# Patient Record
Sex: FEMALE | Race: WHITE | NOT HISPANIC OR LATINO | URBAN - METROPOLITAN AREA
[De-identification: names, ages, dates, MRNs, and addresses within clinical notes are randomized per-mention and may not be internally consistent; named-entity substitution may affect disease eponyms.]

---

## 2023-08-29 ENCOUNTER — APPOINTMENT (OUTPATIENT)
Dept: PRIMARY CARE | Facility: CLINIC | Age: 28
End: 2023-08-29
Payer: COMMERCIAL

## 2023-10-23 ENCOUNTER — TELEPHONE (OUTPATIENT)
Dept: OBSTETRICS AND GYNECOLOGY | Facility: CLINIC | Age: 28
End: 2023-10-23
Payer: COMMERCIAL

## 2023-10-23 DIAGNOSIS — N83.209 CYST OF OVARY, UNSPECIFIED LATERALITY: Primary | ICD-10-CM

## 2023-10-23 NOTE — TELEPHONE ENCOUNTER
Patient states that she was to have an ultrasound but order was not entered. She is request the order to schedule.

## 2023-10-24 NOTE — TELEPHONE ENCOUNTER
Called pt and left message on voicemail of request completed and she can call and scheduled appointment and to call back if needed

## 2024-07-31 ENCOUNTER — TELEPHONE (OUTPATIENT)
Dept: OBSTETRICS AND GYNECOLOGY | Facility: CLINIC | Age: 29
End: 2024-07-31
Payer: COMMERCIAL

## 2024-07-31 DIAGNOSIS — R39.9 UTI SYMPTOMS: ICD-10-CM

## 2024-07-31 NOTE — TELEPHONE ENCOUNTER
Patient called due to increase pelvic discomforted for 3 days. Patient stated she has UTI symptoms. Frequency or urination and burning. Patient is not taking anything over the counter but stated she felt like she was getting better with drinking water and cranberry juice. Patient has an appointment this coming Friday. Orders placed for UA and urine culture.

## 2024-08-02 ENCOUNTER — APPOINTMENT (OUTPATIENT)
Dept: OBSTETRICS AND GYNECOLOGY | Facility: CLINIC | Age: 29
End: 2024-08-02
Payer: COMMERCIAL

## 2024-08-02 VITALS
DIASTOLIC BLOOD PRESSURE: 62 MMHG | BODY MASS INDEX: 22.96 KG/M2 | WEIGHT: 155 LBS | HEIGHT: 69 IN | SYSTOLIC BLOOD PRESSURE: 98 MMHG

## 2024-08-02 DIAGNOSIS — N91.4 SECONDARY OLIGOMENORRHEA: ICD-10-CM

## 2024-08-02 DIAGNOSIS — R10.30 LOWER ABDOMINAL PAIN: ICD-10-CM

## 2024-08-02 DIAGNOSIS — Z01.419 ENCOUNTER FOR ANNUAL ROUTINE GYNECOLOGICAL EXAMINATION: Primary | ICD-10-CM

## 2024-08-02 PROCEDURE — 99395 PREV VISIT EST AGE 18-39: CPT | Performed by: OBSTETRICS & GYNECOLOGY

## 2024-08-02 PROCEDURE — 1036F TOBACCO NON-USER: CPT | Performed by: OBSTETRICS & GYNECOLOGY

## 2024-08-02 PROCEDURE — 3008F BODY MASS INDEX DOCD: CPT | Performed by: OBSTETRICS & GYNECOLOGY

## 2024-08-02 ASSESSMENT — PAIN SCALES - GENERAL: PAINLEVEL: 0-NO PAIN

## 2024-08-02 NOTE — PROGRESS NOTES
"Assessment     PLAN  1. Encounter for annual routine gynecological examination  - pap UTD    2. Secondary oligomenorrhea  - suspect PCOS given PCO appearing ovaries and oligomenorrhea with long cycle length. Recommend further lab workup as shown below. She would like to start trying to conceive soon. Recommend follow up to discuss possible ovulation induction  - Follicle Stimulating Hormone; Future  - Estradiol; Future  - DHEA-Sulfate; Future  - Prolactin; Future  - Testosterone,Free and Total; Future  - TSH with reflex to Free T4 if abnormal; Future    3. Lower abdominal pain  - had an episode of severe pain a few days ago, now resolved. History of ovarian cyst. Recommend further evaluation with pelvic ultrasound  - US PELVIS TRANSABDOMINAL WITH TRANSVAGINAL; Future    Please return PRN    Cinthya Rivas MD      Subjective     Nirmala Hollins is a 29 y.o. female who is here for a routine exam.   PCP = No primary care provider on file.    APE Concerns:   - told she might have PCOS in the past  - had an episode of severe abdominal pain recently, no N/V, felt like ovarian cyst in the past, lasted 4-5 hours, occurred 2 days ago    Gynecologic History:    GYN history: history of right ovarian cyst - improved on ultrasound last year, ovaries PCO appearing  OBHx: G0  Menses: cycle length long ~38 days, regular menses  Last Pap: NILM 7/2023, due 2026  HPV Vaccine: completes series  History of Dysplasia: reports abnormal as a teen  Sexually active: active with fiance  STI testing: Declines  Contraception: None, desires to conceive soon  FamHx of GYN cancers:  Denies      PMH, PSH, FH, SH, medications and allergies reviewed and edited as needed.      Objective   BP 98/62   Ht 1.753 m (5' 9\")   Wt 70.3 kg (155 lb)   LMP 07/01/2024 (Exact Date)   BMI 22.89 kg/m²      General:   Alert and oriented, in no acute distress   Neck: Supple. No visible thyromegaly.    Breast/Axilla: Normal to palpation bilaterally without " masses, skin changes, or nipple discharge.    Abdomen: Soft, non-tender, without masses or organomegaly   Vulva: Normal architecture without erythema, masses, or lesions.    Vagina: Normal mucosa without lesions, masses, or atrophy. No abnormal vaginal discharge.    Cervix: Normal without masses, lesions, or signs of cervicitis.    Uterus: Normal mobile, non-enlarged uterus    Adnexa: Normal without masses or lesions   Pelvic Floor No POP noted. No high tone pelvic floor   Psych Normal affect. Normal mood.

## 2024-09-06 ENCOUNTER — APPOINTMENT (OUTPATIENT)
Dept: OBSTETRICS AND GYNECOLOGY | Facility: CLINIC | Age: 29
End: 2024-09-06
Payer: COMMERCIAL

## 2024-09-06 VITALS — SYSTOLIC BLOOD PRESSURE: 104 MMHG | WEIGHT: 161 LBS | DIASTOLIC BLOOD PRESSURE: 68 MMHG | BODY MASS INDEX: 23.78 KG/M2

## 2024-09-06 DIAGNOSIS — Z82.79 FAMILY HISTORY OF CONGENITAL HEART DEFECT: ICD-10-CM

## 2024-09-06 DIAGNOSIS — Z11.3 ROUTINE SCREENING FOR STI (SEXUALLY TRANSMITTED INFECTION): ICD-10-CM

## 2024-09-06 DIAGNOSIS — Z3A.09 9 WEEKS GESTATION OF PREGNANCY (HHS-HCC): Primary | ICD-10-CM

## 2024-09-06 PROCEDURE — 87491 CHLMYD TRACH DNA AMP PROBE: CPT

## 2024-09-06 PROCEDURE — 87661 TRICHOMONAS VAGINALIS AMPLIF: CPT

## 2024-09-06 PROCEDURE — 87591 N.GONORRHOEAE DNA AMP PROB: CPT

## 2024-09-06 PROCEDURE — 87086 URINE CULTURE/COLONY COUNT: CPT

## 2024-09-06 ASSESSMENT — EDINBURGH POSTNATAL DEPRESSION SCALE (EPDS)
I HAVE BEEN SO UNHAPPY THAT I HAVE BEEN CRYING: NO, NEVER
I HAVE BLAMED MYSELF UNNECESSARILY WHEN THINGS WENT WRONG: NO, NEVER
I HAVE LOOKED FORWARD WITH ENJOYMENT TO THINGS: AS MUCH AS I EVER DID
THE THOUGHT OF HARMING MYSELF HAS OCCURRED TO ME: NEVER
I HAVE BEEN SO UNHAPPY THAT I HAVE HAD DIFFICULTY SLEEPING: NOT AT ALL
I HAVE FELT SCARED OR PANICKY FOR NO GOOD REASON: NO, NOT AT ALL
I HAVE BEEN ANXIOUS OR WORRIED FOR NO GOOD REASON: NO, NOT AT ALL
I HAVE FELT SAD OR MISERABLE: NO, NOT AT ALL
I HAVE BEEN ABLE TO LAUGH AND SEE THE FUNNY SIDE OF THINGS: AS MUCH AS I ALWAYS COULD
THINGS HAVE BEEN GETTING ON TOP OF ME: NO, I HAVE BEEN COPING AS WELL AS EVER
TOTAL SCORE: 0

## 2024-09-06 NOTE — PROGRESS NOTES
NEW OB VISIT    Assessment/Plan    Problem List Items Addressed This Visit       Family history of congenital heart defect    Overview     Father of the baby was born with a VSD and coarctation of the aorta.  Recommend fetal echo         9 weeks gestation of pregnancy ([facility]) - Primary    Overview     Desired provider in labor: [] CNM  [x] Physician  [] Blood Products: [] Yes, accepts [] No, needs counseling  [x] Initial BMI: Could not be calculated   [] Prenatal Labs: ordered 9/6  [] Cervical Cancer Screening up to date, NILM 7/2023  [] Rh status:   [] Genetic Screening:  discussed, desires cfDNA (ordered), interested in expanded carrier testing specific for ashkenazi [employer], will touch base with genetics and see if she needs referral for this  [] NT US: (11-13 wks): ordered  [x] Baby ASA (if indicated): Not indicated  [] Pregnancy dated by:     [] Anatomy US: (19-20 wks)  [] Federal Sterilization consent signed (if indicated):  [] 1hr GCT at 24-28wks:  [] Rhogam (if indicated):   [] Fetal Surveillance (if indicated):  [] Tdap (27-32 wks, may be given up to 36 wks if initial window missed):   [] RSV (32-36 wks) (Sept. to end of Jan):   [] Flu Vaccine:    [] Breastfeeding:  [] Postpartum Birth control method:   [] GBS at 36 - 37 wks:  [] 39 weeks discussion of IOL vs. Expectant management:  [] Mode of delivery ( anticipated ):           Relevant Orders    C. trachomatis / N. gonorrhoeae, Amplified    Myriad Prequel Prenatal Screen    CBC Anemia Panel With Reflex,Pregnancy    Hemoglobin Identification with Path Review    Hepatitis B Surface Antigen    Hepatitis C Antibody    HIV 1/2 Antigen/Antibody Screen with Reflex to Confirmation    US [facility] OB imaging order    Urine Culture    Type And Screen    Syphilis Screen with Reflex    Rubella Antibody, IgG     Other Visit Diagnoses       Routine screening for STI (sexually transmitted infection)        Relevant Orders    C. Trachomatis / N. Gonorrhoeae, Amplified  Detection    Trichomonas vaginalis, Nucleic Acid Detection          Follow up in 4 weeks or sooner as needed    Cinthya Rivas MD        Subjective     Nirmala Hollins is a 29 y.o.  at 9w4d by LMP (irregular cycle) who presents for an initial prenatal visit.     She c/o some breast tenderness. Minimal N/V.   Some breast tenderness, minimal N/v.   VSD, coarctation of aorta OFOB    OB Hx: NA  Past Medical Hx: None  Past Surgical Hx: None  Social Hx: denies  Family Hx: Father of the baby has a congenital VSD and coarctation of the aorta  Medications: PNV, choline, fiber, probiotic  Allergies: Bactrim, hives/itching  Pap Hx: NILM 2023  Flu vaccine: Discussed, will consider for next visit    Physical Exam  Objective   /68   Wt 73 kg (161 lb)   LMP 2024 (Exact Date)   BMI 23.78 kg/m²      General:   Alert and oriented, in no acute distress

## 2024-09-07 LAB
BACTERIA UR CULT: NO GROWTH
T VAGINALIS RRNA SPEC QL NAA+PROBE: NEGATIVE

## 2024-09-08 LAB
C TRACH RRNA SPEC QL NAA+PROBE: NEGATIVE
N GONORRHOEA DNA SPEC QL PROBE+SIG AMP: NEGATIVE

## 2024-09-09 ENCOUNTER — TELEPHONE (OUTPATIENT)
Dept: OBSTETRICS AND GYNECOLOGY | Facility: CLINIC | Age: 29
End: 2024-09-09
Payer: COMMERCIAL

## 2024-09-10 DIAGNOSIS — Z3A.09 9 WEEKS GESTATION OF PREGNANCY (HHS-HCC): Primary | ICD-10-CM

## 2024-09-19 ENCOUNTER — APPOINTMENT (OUTPATIENT)
Dept: RADIOLOGY | Facility: CLINIC | Age: 29
End: 2024-09-19
Payer: COMMERCIAL

## 2024-09-20 ENCOUNTER — LAB (OUTPATIENT)
Dept: LAB | Facility: LAB | Age: 29
End: 2024-09-20
Payer: COMMERCIAL

## 2024-09-20 ENCOUNTER — HOSPITAL ENCOUNTER (OUTPATIENT)
Dept: RADIOLOGY | Facility: CLINIC | Age: 29
Discharge: HOME | End: 2024-09-20
Payer: COMMERCIAL

## 2024-09-20 DIAGNOSIS — Z3A.09 9 WEEKS GESTATION OF PREGNANCY (HHS-HCC): ICD-10-CM

## 2024-09-20 LAB
ABO GROUP (TYPE) IN BLOOD: NORMAL
ANTIBODY SCREEN: NORMAL
RH FACTOR (ANTIGEN D): NORMAL

## 2024-09-20 PROCEDURE — 87340 HEPATITIS B SURFACE AG IA: CPT

## 2024-09-20 PROCEDURE — 36415 COLL VENOUS BLD VENIPUNCTURE: CPT

## 2024-09-20 PROCEDURE — 87389 HIV-1 AG W/HIV-1&-2 AB AG IA: CPT

## 2024-09-20 PROCEDURE — 76801 OB US < 14 WKS SINGLE FETUS: CPT

## 2024-09-20 PROCEDURE — 86901 BLOOD TYPING SEROLOGIC RH(D): CPT

## 2024-09-20 PROCEDURE — 86803 HEPATITIS C AB TEST: CPT

## 2024-09-20 PROCEDURE — 83020 HEMOGLOBIN ELECTROPHORESIS: CPT | Performed by: OBSTETRICS & GYNECOLOGY

## 2024-09-20 PROCEDURE — 86317 IMMUNOASSAY INFECTIOUS AGENT: CPT

## 2024-09-20 PROCEDURE — 83021 HEMOGLOBIN CHROMOTOGRAPHY: CPT

## 2024-09-20 PROCEDURE — 86900 BLOOD TYPING SEROLOGIC ABO: CPT

## 2024-09-20 PROCEDURE — 86850 RBC ANTIBODY SCREEN: CPT

## 2024-09-20 PROCEDURE — 85027 COMPLETE CBC AUTOMATED: CPT

## 2024-09-21 LAB
ERYTHROCYTE [DISTWIDTH] IN BLOOD BY AUTOMATED COUNT: 14.2 % (ref 11.5–14.5)
HBV SURFACE AG SERPL QL IA: NONREACTIVE
HCT VFR BLD AUTO: 37.4 % (ref 36–46)
HCV AB SER QL: NONREACTIVE
HGB BLD-MCNC: 12.8 G/DL (ref 12–16)
HIV 1+2 AB+HIV1 P24 AG SERPL QL IA: NONREACTIVE
MCH RBC QN AUTO: 31.1 PG (ref 26–34)
MCHC RBC AUTO-ENTMCNC: 34.2 G/DL (ref 32–36)
MCV RBC AUTO: 91 FL (ref 80–100)
NRBC BLD-RTO: 0 /100 WBCS (ref 0–0)
PLATELET # BLD AUTO: 188 X10*3/UL (ref 150–450)
RBC # BLD AUTO: 4.12 X10*6/UL (ref 4–5.2)
REFLEX ADDED, ANEMIA PANEL: NORMAL
RUBV IGG SERPL IA-ACNC: 1.2 IA
RUBV IGG SERPL QL IA: POSITIVE
WBC # BLD AUTO: 7.3 X10*3/UL (ref 4.4–11.3)

## 2024-09-23 LAB
HEMOGLOBIN A2: 2.9 % (ref 2–3.5)
HEMOGLOBIN A: 96.7 % (ref 95.8–98)
HEMOGLOBIN F: 0.4 % (ref 0–2)
HEMOGLOBIN IDENTIFICATION INTERPRETATION: NORMAL
PATH REVIEW-HGB IDENTIFICATION: NORMAL

## 2024-09-30 ENCOUNTER — LAB (OUTPATIENT)
Dept: LAB | Facility: LAB | Age: 29
End: 2024-09-30
Payer: COMMERCIAL

## 2024-09-30 ENCOUNTER — APPOINTMENT (OUTPATIENT)
Dept: OBSTETRICS AND GYNECOLOGY | Facility: CLINIC | Age: 29
End: 2024-09-30
Payer: COMMERCIAL

## 2024-09-30 VITALS — BODY MASS INDEX: 24.43 KG/M2 | DIASTOLIC BLOOD PRESSURE: 64 MMHG | SYSTOLIC BLOOD PRESSURE: 106 MMHG | WEIGHT: 165.4 LBS

## 2024-09-30 DIAGNOSIS — Z3A.13 13 WEEKS GESTATION OF PREGNANCY (HHS-HCC): ICD-10-CM

## 2024-09-30 DIAGNOSIS — Z34.02 ENCOUNTER FOR SUPERVISION OF NORMAL FIRST PREGNANCY IN SECOND TRIMESTER: Primary | ICD-10-CM

## 2024-09-30 DIAGNOSIS — Z3A.09 9 WEEKS GESTATION OF PREGNANCY (HHS-HCC): ICD-10-CM

## 2024-09-30 DIAGNOSIS — Z82.79 FAMILY HISTORY OF CONGENITAL HEART DEFECT: ICD-10-CM

## 2024-09-30 DIAGNOSIS — Z78.9 INFLUENZA VACCINATION ORDERED: ICD-10-CM

## 2024-09-30 LAB — TREPONEMA PALLIDUM IGG+IGM AB [PRESENCE] IN SERUM OR PLASMA BY IMMUNOASSAY: NONREACTIVE

## 2024-09-30 PROCEDURE — 81329 SMN1 GENE DOS/DELETION ALYS: CPT

## 2024-09-30 PROCEDURE — 90656 IIV3 VACC NO PRSV 0.5 ML IM: CPT | Performed by: OBSTETRICS & GYNECOLOGY

## 2024-09-30 PROCEDURE — 81220 CFTR GENE COM VARIANTS: CPT

## 2024-09-30 PROCEDURE — 90471 IMMUNIZATION ADMIN: CPT | Performed by: OBSTETRICS & GYNECOLOGY

## 2024-09-30 PROCEDURE — 0501F PRENATAL FLOW SHEET: CPT | Performed by: OBSTETRICS & GYNECOLOGY

## 2024-09-30 PROCEDURE — 86780 TREPONEMA PALLIDUM: CPT

## 2024-09-30 PROCEDURE — 81243 FMR1 GEN ALY DETC ABNL ALLEL: CPT

## 2024-09-30 PROCEDURE — 36415 COLL VENOUS BLD VENIPUNCTURE: CPT

## 2024-09-30 NOTE — PROGRESS NOTES
Ob Follow-up  24     SUBJECTIVE      HPI: Nirmala Hollins is a 29 y.o.  at 13w0d here for RPNV.  Patient reports doing well. Some discomfort in hips with sleeping.        OBJECTIVE  Visit Vitals  /64   Wt 75 kg (165 lb 6.4 oz)   LMP 2024 (Exact Date)   BMI 24.43 kg/m²   OB Status Pregnant   Smoking Status Never   BSA 1.91 m²            ASSESSMENT & PLAN    Nirmala Hollins is a 29 y.o.  at 13w0d here for the following concerns we addressed today:    Problem List Items Addressed This Visit       Family history of congenital heart defect    Overview     Father of the baby was born with a VSD and coarctation of the aorta.  Fetal echo ordered         Relevant Orders    Fetal Echo Complete    13 weeks gestation of pregnancy ([facility])    Overview     Desired provider in labor: [] CNM  [x] Physician  [x] Blood Products: [x] Yes, accepts [] No, needs counseling  [x] Initial BMI: 24  [x] Prenatal Labs: A+, RI, Hgb 12.8, syphilis not done  [x] Cervical Cancer Screening up to date, [facility] 2023  [x] Rh status:  positive  [] Genetic Screening:  rr cfDNA, still needs CF/SMA drawn  [x] NT US: (11-13 wks): Normal  [x] Baby ASA (if indicated): Not indicated  [x] Pregnancy dated by: LMP consistent with 11 week ultrasound    [] Anatomy US: (19-20 wks)  [] Federal Sterilization consent signed (if indicated):  [] 1hr GCT at 24-28wks:  [x] Rhogam (if indicated): Not indicated  [] Fetal Surveillance (if indicated):  [] Tdap (27-32 wks, may be given up to 36 wks if initial window missed):   [] RSV (32-36 wks) (Sept. to end of ):   [x] Flu Vaccine: Given 24    [] Breastfeeding:  [] Postpartum Birth control method:   [] GBS at 36 - 37 wks:  [] 39 weeks discussion of IOL vs. Expectant management:  [] Mode of delivery ( anticipated ):           Encounter for supervision of normal first pregnancy in second trimester ([facility]) - Primary     Other Visit Diagnoses       Influenza vaccination ordered         Relevant Orders    Flu vaccine, trivalent, preservative free, age 6 months and greater (Fluarix/Fluzone/Flulaval) (Completed)            RTC in 4 weeks      Cinthya Rivas MD

## 2024-09-30 NOTE — PROGRESS NOTES
Flu vaccine per provider  Given IM into left deltoid  Supplied by office  Patient tolerated well  VIS given     LOT #: [id number]  Expiration date: 06/30/2025

## 2024-10-01 ENCOUNTER — APPOINTMENT (OUTPATIENT)
Dept: RADIOLOGY | Facility: HOSPITAL | Age: 29
End: 2024-10-01
Payer: COMMERCIAL

## 2024-10-03 LAB
COMMENTS - MP RESULT TYPE: NORMAL
ELECTRONICALLY SIGNED BY: NORMAL
SCAN RESULT: NORMAL
SMA RESULT: NORMAL

## 2024-10-04 LAB
CFTR MUT ANL BLD/T: NORMAL
ELECTRONICALLY SIGNED BY: NORMAL

## 2024-10-07 LAB
ELECTRONICALLY SIGNED BY: NORMAL
FRAGILE X INTERPRETATION: NORMAL
FRAGILE X RESULT: NORMAL

## 2024-10-28 ENCOUNTER — APPOINTMENT (OUTPATIENT)
Dept: OBSTETRICS AND GYNECOLOGY | Facility: CLINIC | Age: 29
End: 2024-10-28
Payer: COMMERCIAL

## 2024-10-28 VITALS — WEIGHT: 175 LBS | DIASTOLIC BLOOD PRESSURE: 60 MMHG | SYSTOLIC BLOOD PRESSURE: 108 MMHG | BODY MASS INDEX: 25.84 KG/M2

## 2024-10-28 DIAGNOSIS — Z34.02 ENCOUNTER FOR SUPERVISION OF NORMAL FIRST PREGNANCY IN SECOND TRIMESTER: Primary | ICD-10-CM

## 2024-10-28 DIAGNOSIS — Z82.79 FAMILY HISTORY OF CONGENITAL HEART DEFECT: ICD-10-CM

## 2024-10-28 DIAGNOSIS — Z3A.17 17 WEEKS GESTATION OF PREGNANCY (HHS-HCC): ICD-10-CM

## 2024-10-28 PROCEDURE — 0501F PRENATAL FLOW SHEET: CPT | Performed by: OBSTETRICS & GYNECOLOGY

## 2024-11-11 ENCOUNTER — HOSPITAL ENCOUNTER (OUTPATIENT)
Dept: RADIOLOGY | Facility: CLINIC | Age: 29
Discharge: HOME | End: 2024-11-11
Payer: COMMERCIAL

## 2024-11-11 DIAGNOSIS — Z3A.09 9 WEEKS GESTATION OF PREGNANCY (HHS-HCC): ICD-10-CM

## 2024-11-11 PROCEDURE — 76811 OB US DETAILED SNGL FETUS: CPT | Performed by: STUDENT IN AN ORGANIZED HEALTH CARE EDUCATION/TRAINING PROGRAM

## 2024-11-11 PROCEDURE — 76811 OB US DETAILED SNGL FETUS: CPT

## 2024-11-14 ENCOUNTER — APPOINTMENT (OUTPATIENT)
Dept: PEDIATRIC CARDIOLOGY | Facility: CLINIC | Age: 29
End: 2024-11-14
Payer: COMMERCIAL

## 2024-11-14 VITALS
WEIGHT: 180.12 LBS | DIASTOLIC BLOOD PRESSURE: 55 MMHG | HEART RATE: 78 BPM | BODY MASS INDEX: 25.22 KG/M2 | HEIGHT: 71 IN | SYSTOLIC BLOOD PRESSURE: 117 MMHG

## 2024-11-14 DIAGNOSIS — O35.BXX0 ABNORMAL FETAL ECHOCARDIOGRAPHY AFFECTING ANTEPARTUM CARE OF MOTHER, SINGLE OR UNSPECIFIED FETUS (HHS-HCC): Primary | ICD-10-CM

## 2024-11-14 DIAGNOSIS — O28.3 ABNORMAL FETAL ULTRASOUND: ICD-10-CM

## 2024-11-14 DIAGNOSIS — O35.8XX0 MATERNAL CARE FOR OTHER (SUSPECTED) FETAL ABNORMALITY AND DAMAGE, NOT APPLICABLE OR UNSPECIFIED (HHS-HCC): ICD-10-CM

## 2024-11-14 DIAGNOSIS — Z82.79 FAMILY HISTORY OF CONGENITAL HEART DEFECT: ICD-10-CM

## 2024-11-14 PROCEDURE — 76825 ECHO EXAM OF FETAL HEART: CPT | Performed by: PEDIATRICS

## 2024-11-14 PROCEDURE — 3008F BODY MASS INDEX DOCD: CPT | Performed by: PEDIATRICS

## 2024-11-14 PROCEDURE — 93325 DOPPLER ECHO COLOR FLOW MAPG: CPT | Performed by: PEDIATRICS

## 2024-11-14 PROCEDURE — 99204 OFFICE O/P NEW MOD 45 MIN: CPT | Performed by: PEDIATRICS

## 2024-11-14 PROCEDURE — 76827 ECHO EXAM OF FETAL HEART: CPT | Performed by: PEDIATRICS

## 2024-11-14 RX ORDER — TRETINOIN 1 MG/G
CREAM TOPICAL
COMMUNITY
Start: 2023-07-28

## 2024-11-14 NOTE — PROGRESS NOTES
Nirmala Hollins was seen at the request of Dr. Cinthya Rivas for a chief complaint of family history of congenital heart disease; a report with my findings is being sent via written or electronic means the referring physician with my recommendations for treatment.     I had the pleasure of seeing Nirmala Hollins in Pediatric Cardiology consultation at our [facility] location as part of our prenatal heart program for family history of congenital heart disease. Specifically,  her baby's father has a history of coarctation of the aorta and a ventricular septal defect. He had surgery as an infant and a stent placed at age 14.  She is a 29 y.o. year-old  woman, currently 19w3d weeks gestation.  Her last menstrual period was Patient's last menstrual period was 2024 (exact date).  Estimated date of delivery is Estimated Date of Delivery: 25.  There have been no pregnancy complications.   She has not been hospitalized during this pregnancy.  She had a NIPT  which was normal.  She had a second trimester ultrasound which was normal.      Prior to the visit, I personally reviewed the cardiac portions of the obstetrical ultrasound performed on 2024.  There is normal segmental anatomy with normal 4 chamber, outflow tract and 3 vessel views.      Her previous obstetrical history is significant for first pregnancy.  Her past medical history is without complication.  She has no history of congenital heart disease, arrhythmia, cardiomyopathy, hypercholesterolemia, hypertension, diabetes, rheumatic heart disease, cancer, asthma, lupus, Sjogren syndrome, clotting disorder, depression, anxiety, alcohol abuse, phenylketonuria, or DiGeorge.  She has had no surgeries.  She is not taking any medications.  She has is allergic to sulfamethoxazole-trimethoprim..  She is currently taking prenatal vitamins.      The father of her baby has a history of coarctation of the aorta and ventricular septal defect. Her  "family history is negative for congenital heart disease, early atherosclerosis, sudden cardiac death, long QT syndrome, cardiomyopathy, aortic aneurysm, or genetic or metabolic disease.      She currently lives with her fiance.  She works as a dental hygienist.  She does not smoke.  She denies illicit drug use or alcohol abuse.  She denies verbal, sexual, or physical abuse.     Delivery Hospital: [facility]  Father of the baby's name: Davy    /55 (BP Location: Right arm, Patient Position: Sitting)   Pulse 78   Ht 1.792 m (5' 10.55\")   Wt 81.7 kg (180 lb 1.9 oz)   LMP 2024 (Exact Date)   BMI 25.44 kg/m²     She was resting comfortably in the examination room and alert, active and in no respiratory distress. Skin was without rash.  HEENT: moist mucous membranes, no JVD, goiter. Breathing is not labored.  She was acyanotic.  There was no peripheral edema.   The abdomen was gravid, soft, nontender with normal bowel sounds.  The liver was not palpable.  The spleen tip was not palpable.  She had a normal gait and normal strength in all extremities.  Cranial nerves II - XII are intact.  She had no clubbing, cyanosis, or edema.    A two-dimensional and Doppler fetal echocardiogram was performed today and interpreted by me at 19w3d weeks gestation.  The fetal echocardiogram showed normal segmental anatomy with no structural abnormalities found.  There is normal cardiac function.  There is no evidence of septation defect, right or left ventricular outflow obstruction or significant valvular regurgitation.  The fetal heart rate was within normal limits without ectopy or arrhythmia seen.  The spectral Doppler pattern across all valves, venous structures, and arterial structures was within normal limits.  There is no pericardial effusion.  Please see full report for details.    In summary, Nirmala Hollins is a 29 y.o. year-old  woman, currently 19w3d weeks gestation, " who had a normal fetal echocardiogram at today's visit.  Therefore, we did not make any changes to her current delivery plan.  We did not prescribe any medications.  We did not recommend intervention.  She does not necessarily need to follow up with pediatric cardiology after the baby is born unless the pediatric team has any concerns or worries.     Thank you for allowing me to participate in Nirmala's care.  If you have any further questions, please do not hesitate to contact me.     Santi Gotti M.D.  [facility], Director  Ambulatory Pediatric Cardiology   Division of Pediatric Cardiology  [facility]  [facility]   of Pediatrics, [employer] [facility] [employer]  [facility] [address]  [place] [address]  Office:  [phone]  Fax:       [phone]  e-mail:  [email or web address]    I spent greater than 45 minutes in performance of this consultation, of which greater than 50% was related to coordination of care or counseling.

## 2024-11-14 NOTE — LETTER
2024     Cinthya Rivas MD  [address]  Department Of Ob/Gyn  [facility] [id number]    Patient: Nirmala Hollins   YOB: 1995   Date of Visit: 2024       Dear Dr. Cinthya Rivas MD:    Thank you for referring Nirmala Hollins to me for evaluation. Below are my notes for this consultation.  If you have questions, please do not hesitate to call me. I look forward to following your patient along with you.       Sincerely,     Santi Gotti MD      CC: Amrita Fountain, APRN-CNM, DNP  Dipti Mae, APRN-CNP  ______________________________________________________________________________________    Nirmala Hollins was seen at the request of Dr. Cinthya Rivas for a chief complaint of family history of congenital heart disease; a report with my findings is being sent via written or electronic means the referring physician with my recommendations for treatment.     I had the pleasure of seeing Nirmala Hollins in Pediatric Cardiology consultation at our [facility] location as part of our prenatal heart program for family history of congenital heart disease. Specifically,  her baby's father has a history of coarctation of the aorta and a ventricular septal defect. He had surgery as an infant and a stent placed at age 14.  She is a 29 y.o. year-old  woman, currently 19w3d weeks gestation.  Her last menstrual period was Patient's last menstrual period was 2024 (exact date).  Estimated date of delivery is Estimated Date of Delivery: 25.  There have been no pregnancy complications.   She has not been hospitalized during this pregnancy.  She had a NIPT  which was normal.  She had a second trimester ultrasound which was normal.      Prior to the visit, I personally reviewed the cardiac portions of the obstetrical ultrasound performed on 2024.  There is normal segmental anatomy with normal 4 chamber, outflow tract and 3 vessel views.      Her  "previous obstetrical history is significant for first pregnancy.  Her past medical history is without complication.  She has no history of congenital heart disease, arrhythmia, cardiomyopathy, hypercholesterolemia, hypertension, diabetes, rheumatic heart disease, cancer, asthma, lupus, Sjogren syndrome, clotting disorder, depression, anxiety, alcohol abuse, phenylketonuria, or DiGeorge.  She has had no surgeries.  She is not taking any medications.  She has is allergic to sulfamethoxazole-trimethoprim..  She is currently taking prenatal vitamins.      The father of her baby has a history of coarctation of the aorta and ventricular septal defect. Her family history is negative for congenital heart disease, early atherosclerosis, sudden cardiac death, long QT syndrome, cardiomyopathy, aortic aneurysm, or genetic or metabolic disease.      She currently lives with her fiance.  She works as a dental hygienist.  She does not smoke.  She denies illicit drug use or alcohol abuse.  She denies verbal, sexual, or physical abuse.     Delivery Hospital: [facility]  Father of the baby's name: Davy    /55 (BP Location: Right arm, Patient Position: Sitting)   Pulse 78   Ht 1.792 m (5' 10.55\")   Wt 81.7 kg (180 lb 1.9 oz)   LMP 07/01/2024 (Exact Date)   BMI 25.44 kg/m²     She was resting comfortably in the examination room and alert, active and in no respiratory distress. Skin was without rash.  HEENT: moist mucous membranes, no JVD, goiter. Breathing is not labored.  She was acyanotic.  There was no peripheral edema.   The abdomen was gravid, soft, nontender with normal bowel sounds.  The liver was not palpable.  The spleen tip was not palpable.  She had a normal gait and normal strength in all extremities.  Cranial nerves II - XII are intact.  She had no clubbing, cyanosis, or edema.    A two-dimensional and Doppler fetal echocardiogram was performed today and interpreted by me at " 19w3d weeks gestation.  The fetal echocardiogram showed normal segmental anatomy with no structural abnormalities found.  There is normal cardiac function.  There is no evidence of septation defect, right or left ventricular outflow obstruction or significant valvular regurgitation.  The fetal heart rate was within normal limits without ectopy or arrhythmia seen.  The spectral Doppler pattern across all valves, venous structures, and arterial structures was within normal limits.  There is no pericardial effusion.  Please see full report for details.    In summary, Nirmala Hollins is a 29 y.o. year-old  woman, currently 19w3d weeks gestation, who had a normal fetal echocardiogram at today's visit.  Therefore, we did not make any changes to her current delivery plan.  We did not prescribe any medications.  We did not recommend intervention.  She does not necessarily need to follow up with pediatric cardiology after the baby is born unless the pediatric team has any concerns or worries.     Thank you for allowing me to participate in Nirmala's care.  If you have any further questions, please do not hesitate to contact me.     Santi Gotti M.D.  [facility], Director  Ambulatory Pediatric Cardiology   Division of Pediatric Cardiology  [facility]  [facility]   of Pediatrics, [facility] [employer]  [facility] [address]  [address]  [place] [address]  Office:  [phone]  Fax:       [phone]  e-mail:  [email or web address]    I spent greater than 45 minutes in performance of this consultation, of which greater than 50% was related to coordination of care or counseling.

## 2024-11-18 ENCOUNTER — APPOINTMENT (OUTPATIENT)
Dept: OBSTETRICS AND GYNECOLOGY | Facility: CLINIC | Age: 29
End: 2024-11-18
Payer: COMMERCIAL

## 2024-11-18 VITALS — BODY MASS INDEX: 25.57 KG/M2 | WEIGHT: 181 LBS | SYSTOLIC BLOOD PRESSURE: 118 MMHG | DIASTOLIC BLOOD PRESSURE: 60 MMHG

## 2024-11-18 DIAGNOSIS — Z34.02 ENCOUNTER FOR SUPERVISION OF NORMAL FIRST PREGNANCY IN SECOND TRIMESTER: Primary | ICD-10-CM

## 2024-11-18 DIAGNOSIS — Z3A.20 20 WEEKS GESTATION OF PREGNANCY (HHS-HCC): ICD-10-CM

## 2024-11-18 DIAGNOSIS — Z82.79 FAMILY HISTORY OF CONGENITAL HEART DEFECT: ICD-10-CM

## 2024-11-18 PROCEDURE — 0501F PRENATAL FLOW SHEET: CPT | Performed by: OBSTETRICS & GYNECOLOGY

## 2024-11-18 NOTE — PROGRESS NOTES
Ob Follow-up  24     SUBJECTIVE      HPI: Nirmala Hollins is a 29 y.o.  at 20w0d here for RPNV.  She has no contractions, bleeding, or LOF. Started feeling fetal movement. Patient reports doing well.        OBJECTIVE  Visit Vitals  /60   Wt 82.1 kg (181 lb)   LMP 2024 (Exact Date)   BMI 25.57 kg/m²   OB Status Pregnant   Smoking Status Never   BSA 2.02 m²        ASSESSMENT & PLAN    Nirmala Hollins is a 29 y.o.  at 20w0d here for the following concerns we addressed today:    Problem List Items Addressed This Visit       20 weeks gestation of pregnancy ([facility])    Overview     Desired provider in labor: [] CNM  [x] Physician  [x] Blood Products: [x] Yes, accepts [] No, needs counseling  [x] Initial BMI: 24  [x] Prenatal Labs: A+, RI, Hgb 12.8, syphilis not done  [x] Cervical Cancer Screening up to date, [facility] 2023  [x] Rh status:  positive  [x] Genetic Screening:  rr cfDNA, neg CF/SMA/fragile X  [x] NT US: (11-13 wks): Normal  [x] Baby ASA (if indicated): Not indicated  [x] Pregnancy dated by: LMP consistent with 11 week ultrasound    [x] Anatomy US: (19-20 wks): Normal  [] Federal Sterilization consent signed (if indicated):  [] 1hr GCT at 24-28wks: ordered for NV  [x] Rhogam (if indicated): Not indicated  [] Fetal Surveillance (if indicated):  [] Tdap (27-32 wks, may be given up to 36 wks if initial window missed):   [] RSV (32-36 wks) (Sept. to end of ):   [x] Flu Vaccine: Given 24    [x] Breastfeeding: Yes, plans to breastfeed, discussed taking a prenatal course  [] Postpartum Birth control method: Discussed 10/28, undecided  [] GBS at 36 - 37 wks:  [] 39 weeks discussion of IOL vs. Expectant management:  [] Mode of delivery ( anticipated ):           Encounter for supervision of normal first pregnancy in second trimester - Primary    Relevant Orders    Glucose, 1 Hour Screen, Pregnancy    CBC Anemia Panel With Reflex,Pregnancy    Syphilis Screen with Reflex    Family  history of congenital heart defect    Overview     Father of the baby was born with a VSD and coarctation of the aorta.  Normal fetal echo              Orders Placed This Encounter   Procedures    Glucose, 1 Hour Screen, Pregnancy     Standing Status:   Future     Standing Expiration Date:   11/18/2025     Order Specific Question:   Release result to MyChart     Answer:   Immediate [1]    CBC Anemia Panel With Reflex,Pregnancy     Standing Status:   Future     Standing Expiration Date:   11/18/2025     Order Specific Question:   Release result to MyChart     Answer:   Immediate [1]    Syphilis Screen with Reflex     Standing Status:   Future     Standing Expiration Date:   11/18/2025     Order Specific Question:   Release result to MyChart     Answer:   Immediate [1]     RTC in 4 weeks      Cinthya Rivas MD

## 2024-12-16 ENCOUNTER — TELEPHONE (OUTPATIENT)
Dept: OBSTETRICS AND GYNECOLOGY | Facility: CLINIC | Age: 29
End: 2024-12-16
Payer: COMMERCIAL

## 2024-12-16 NOTE — TELEPHONE ENCOUNTER
Good morning! I've experienced what I think may be low blood sugar a couple times now during my pregnancy. This morning is the most recent. I did a pretty big workout yesterday and a long walk (it seems to happen after I exercise) and this morning I felt irritable, slight shortness of breath, couldn't really think straight, and started crying out of the blue.      I ate oatmeal with fruit and nuts, cottage cheese, and Gatorade and am feeling a bit better.      I just wanted to make sure there's nothing else I should be doing. I'm going to take it easy on the workouts - less weightlifting and focus more on walking and less intense workouts.      Thanks for your help,     Cat  ---------------------------------  Called patient. Patient does not have any of these symptoms currently. Patient blood pressure looks good. Patient advised to not do intense workout and to not lift anything over 20 lb. Patient advised to let us know if this incident like this occurs again this week.  Patient has an appointment with Dr. Rivas on Monday the 23rd. Will follow up with Dr. Rivas

## 2024-12-20 PROBLEM — Z3A.24 24 WEEKS GESTATION OF PREGNANCY (HHS-HCC): Status: ACTIVE | Noted: 2024-09-06

## 2024-12-20 NOTE — PROGRESS NOTES
Ob Follow-up  24     SUBJECTIVE      HPI: Nirmala Hollins is a 29 y.o.  at 24w4d here for RPNV.  She has no contractions, bleeding, or LOF. Reports normal fetal movement. Patient reports a few episodes of feeling unwell after exercising. BP was slightly low when she checked. She is staying hydrated and eating frequently.        OBJECTIVE  Visit Vitals  /60   Wt 87.1 kg (192 lb)   LMP 2024 (Exact Date)   BMI 27.12 kg/m²   OB Status Pregnant   Smoking Status Never   BSA 2.08 m²            ASSESSMENT & PLAN    Nirmala Hollins is a 29 y.o.  at 24w4d here for the following concerns we addressed today:    Problem List Items Addressed This Visit       25 weeks gestation of pregnancy ([facility])    Overview     Desired provider in labor: [] CNM  [x] Physician  [x] Blood Products: [x] Yes, accepts [] No, needs counseling  [x] Initial BMI: 24  [x] Prenatal Labs: A+, RI, Hgb 12.8, syphilis not done  [x] Cervical Cancer Screening up to date, [facility] 2023  [x] Rh status:  positive  [x] Genetic Screening:  rr cfDNA, neg CF/SMA/fragile X  [x] NT US: (11-13 wks): Normal  [x] Baby ASA (if indicated): Not indicated  [x] Pregnancy dated by: LMP consistent with 11 week ultrasound    [x] Anatomy US: (19-20 wks): Normal  [] Federal Sterilization consent signed (if indicated):  [] 1hr GCT at 24-28wks: pending from today   [x] Rhogam (if indicated): Not indicated  [] Fetal Surveillance (if indicated):  [] Tdap (27-32 wks, may be given up to 36 wks if initial window missed):   [] RSV (32-36 wks) (Sept. to end of ):   [x] Flu Vaccine: Given 24    [x] Breastfeeding: Yes, plans to breastfeed, discussed taking a prenatal course  [] Postpartum Birth control method: Discussed 10/28, undecided  [] GBS at 36 - 37 wks:  [] 39 weeks discussion of IOL vs. Expectant management:  [] Mode of delivery ( anticipated ):           Encounter for supervision of normal first pregnancy in second trimester - Primary    Family  history of congenital heart defect    Overview     Father of the baby was born with a VSD and coarctation of the aorta.  Normal fetal echo            RTC in 4 weeks    Cinthya Rivas MD

## 2024-12-23 ENCOUNTER — LAB (OUTPATIENT)
Dept: LAB | Facility: LAB | Age: 29
End: 2024-12-23
Payer: COMMERCIAL

## 2024-12-23 ENCOUNTER — APPOINTMENT (OUTPATIENT)
Dept: OBSTETRICS AND GYNECOLOGY | Facility: CLINIC | Age: 29
End: 2024-12-23
Payer: COMMERCIAL

## 2024-12-23 VITALS — WEIGHT: 192 LBS | BODY MASS INDEX: 27.12 KG/M2 | SYSTOLIC BLOOD PRESSURE: 104 MMHG | DIASTOLIC BLOOD PRESSURE: 60 MMHG

## 2024-12-23 DIAGNOSIS — Z34.02 ENCOUNTER FOR SUPERVISION OF NORMAL FIRST PREGNANCY IN SECOND TRIMESTER: ICD-10-CM

## 2024-12-23 DIAGNOSIS — Z82.79 FAMILY HISTORY OF CONGENITAL HEART DEFECT: ICD-10-CM

## 2024-12-23 DIAGNOSIS — Z34.02 ENCOUNTER FOR SUPERVISION OF NORMAL FIRST PREGNANCY IN SECOND TRIMESTER: Primary | ICD-10-CM

## 2024-12-23 DIAGNOSIS — Z3A.25 25 WEEKS GESTATION OF PREGNANCY (HHS-HCC): ICD-10-CM

## 2024-12-23 LAB
ERYTHROCYTE [DISTWIDTH] IN BLOOD BY AUTOMATED COUNT: 13.6 % (ref 11.5–14.5)
GLUCOSE 1H P 50 G GLC PO SERPL-MCNC: 83 MG/DL
HCT VFR BLD AUTO: 39.4 % (ref 36–46)
HGB BLD-MCNC: 12.9 G/DL (ref 12–16)
MCH RBC QN AUTO: 31.6 PG (ref 26–34)
MCHC RBC AUTO-ENTMCNC: 32.7 G/DL (ref 32–36)
MCV RBC AUTO: 97 FL (ref 80–100)
NRBC BLD-RTO: 0 /100 WBCS (ref 0–0)
PLATELET # BLD AUTO: 157 X10*3/UL (ref 150–450)
RBC # BLD AUTO: 4.08 X10*6/UL (ref 4–5.2)
REFLEX ADDED, ANEMIA PANEL: NORMAL
TREPONEMA PALLIDUM IGG+IGM AB [PRESENCE] IN SERUM OR PLASMA BY IMMUNOASSAY: NONREACTIVE
WBC # BLD AUTO: 8.1 X10*3/UL (ref 4.4–11.3)

## 2024-12-23 PROCEDURE — 85027 COMPLETE CBC AUTOMATED: CPT

## 2024-12-23 PROCEDURE — 86780 TREPONEMA PALLIDUM: CPT

## 2024-12-23 PROCEDURE — 82947 ASSAY GLUCOSE BLOOD QUANT: CPT

## 2024-12-23 PROCEDURE — 0501F PRENATAL FLOW SHEET: CPT | Performed by: OBSTETRICS & GYNECOLOGY

## 2025-01-14 ENCOUNTER — TELEPHONE (OUTPATIENT)
Dept: OBSTETRICS AND GYNECOLOGY | Facility: CLINIC | Age: 30
End: 2025-01-14
Payer: COMMERCIAL

## 2025-01-14 NOTE — TELEPHONE ENCOUNTER
Patient sent [employer] message stating:  Good morning,      This morning I noticed bright red blood in my stool for the first time. I've had the occasional day of constipation while pregnant but today felt normal (not constipated, no discomfort or straining).      I'm wondering if it might be hemorrhoids? I'm still not having any discomfort but figured I would let you know.      Thanks!      Nirmala      ---  Called patient. Patient stated she the blood in her stool only happened once. Patient does not have any discomfort or pain at this time. Patient advised to monitor it and let us know if symptoms return or anything changes.

## 2025-01-15 ENCOUNTER — ROUTINE PRENATAL (OUTPATIENT)
Dept: OBSTETRICS AND GYNECOLOGY | Facility: CLINIC | Age: 30
End: 2025-01-15
Payer: COMMERCIAL

## 2025-01-15 VITALS — BODY MASS INDEX: 28.11 KG/M2 | WEIGHT: 199 LBS | DIASTOLIC BLOOD PRESSURE: 60 MMHG | SYSTOLIC BLOOD PRESSURE: 126 MMHG

## 2025-01-15 DIAGNOSIS — O26.03 MATERNAL EXCESSIVE WEIGHT GAIN, THIRD TRIMESTER (HHS-HCC): Primary | ICD-10-CM

## 2025-01-15 PROBLEM — Z3A.28 28 WEEKS GESTATION OF PREGNANCY (HHS-HCC): Status: ACTIVE | Noted: 2024-09-06

## 2025-01-15 PROBLEM — O36.8130: Status: ACTIVE | Noted: 2025-01-15

## 2025-01-15 PROCEDURE — 90471 IMMUNIZATION ADMIN: CPT | Performed by: OBSTETRICS & GYNECOLOGY

## 2025-01-15 PROCEDURE — 59025 FETAL NON-STRESS TEST: CPT | Performed by: OBSTETRICS & GYNECOLOGY

## 2025-01-15 PROCEDURE — 0501F PRENATAL FLOW SHEET: CPT | Performed by: OBSTETRICS & GYNECOLOGY

## 2025-01-15 PROCEDURE — 90715 TDAP VACCINE 7 YRS/> IM: CPT | Performed by: OBSTETRICS & GYNECOLOGY

## 2025-01-15 NOTE — ASSESSMENT & PLAN NOTE
- NST today reactive and reassuring - isolated small variable deceleration - normal fluid, practice breathing on BSUS  - Reviewed kick counts and when to contact the office  - Discussed precautions for when to present to L&D triage

## 2025-01-15 NOTE — PROCEDURES
Nirmala Hollins, a  at 28w2d with an RODRIGUEZ of 2025, by Last Menstrual Period, was seen at [facility] for a nonstress test.    Non-Stress Test   Baseline Fetal Heart Rate for Non-Stress Test: 140 BPM  Variability in Waveform for Non-Stress Test: Moderate  Accelerations in Non-Stress Test: Yes, greater than/equal to 15 bpm, lasting at least 15 seconds  Decelerations in Non-Stress Test: Variable  Contractions in Non-Stress Test: Not present  Interpretation of Non-Stress Test   Interpretation of Non-Stress Test: Reactive  Comments on Non-Stress Test: isolated variable            Sowmya Orozco MD

## 2025-01-15 NOTE — PROGRESS NOTES
SUBJECTIVE  Nirmala Hollins is a 29 y.o.  at 28w2d with an estimated date of delivery of 2025, by Last Menstrual Period who presents for a routine prenatal visit.    She denies loss of fluid, vaginal bleeding, regular contractions/cramping. Decreased fetal movemetn last night, has fetal movement today    OBJECTIVE  Vitals:    01/15/25 0956   BP: 126/60   Weight: 90.3 kg (199 lb)          ASSESSMENT & PLAN    28 weeks gestation of pregnancy ([facility])  - Tdap today    Decreased fetal movement affecting management of pregnancy in third trimester ([facility])  - NST today reactive and reassuring - isolated small variable deceleration - normal fluid, practice breathing on BSUS  - Reviewed kick counts and when to contact the office  - Discussed precautions for when to present to L&D triage    Maternal excessive weight gain, third trimester ([facility])  - Growth US ordered - schedule around 30 weeks    Follow up in 4 weeks for return OB visit.    Sowmya Orozco MD  Obstetrics & Gynecology  01/15/25

## 2025-01-23 ENCOUNTER — APPOINTMENT (OUTPATIENT)
Dept: OBSTETRICS AND GYNECOLOGY | Facility: CLINIC | Age: 30
End: 2025-01-23
Payer: COMMERCIAL

## 2025-01-23 VITALS — SYSTOLIC BLOOD PRESSURE: 110 MMHG | WEIGHT: 207 LBS | DIASTOLIC BLOOD PRESSURE: 60 MMHG | BODY MASS INDEX: 29.24 KG/M2

## 2025-01-23 DIAGNOSIS — Z3A.29 29 WEEKS GESTATION OF PREGNANCY (HHS-HCC): ICD-10-CM

## 2025-01-23 DIAGNOSIS — O26.03 MATERNAL EXCESSIVE WEIGHT GAIN, THIRD TRIMESTER (HHS-HCC): Primary | ICD-10-CM

## 2025-01-23 PROCEDURE — 0501F PRENATAL FLOW SHEET: CPT | Performed by: OBSTETRICS & GYNECOLOGY

## 2025-01-23 NOTE — PROGRESS NOTES
Ob Follow-up  25     SUBJECTIVE      HPI: Nirmala Holilns is a 29 y.o.  at 29w3d here for RPNV.  She has no contractions, bleeding, or LOF. Reports normal fetal movement.       OBJECTIVE  Visit Vitals  /60   Wt 93.9 kg (207 lb)   LMP 2024 (Exact Date)   BMI 29.24 kg/m²   OB Status Pregnant   Smoking Status Never   BSA 2.16 m²            ASSESSMENT & PLAN    Nirmala Hollins is a 29 y.o.  at 29w3d here for the following concerns we addressed today:    Problem List Items Addressed This Visit          Pregnancy    Maternal excessive weight gain, third trimester ([facility]) - Primary    29 weeks gestation of pregnancy ([facility])    Overview     Desired provider in labor: [] CNM  [x] Physician  [x] Blood Products: [x] Yes, accepts [] No, needs counseling  [x] Initial BMI: 24  [x] Prenatal Labs: A+, RI, Hgb 12.8, syphilis not done  [x] Cervical Cancer Screening up to date, [facility] 2023  [x] Rh status:  positive  [x] Genetic Screening:  rr cfDNA, neg CF/SMA/fragile X  [x] NT US: (11-13 wks): Normal  [x] Baby ASA (if indicated): Not indicated  [x] Pregnancy dated by: LMP consistent with 11 week ultrasound    [x] Anatomy US: (19-20 wks): Normal  [x] Federal Sterilization consent signed (if indicated): N/A  [x] 1hr GCT at 24-28wks: 83  [x] Rhogam (if indicated): Not indicated  [x] Fetal Surveillance (if indicated): Not indicated at this time  [x] Tdap (27-32 wks, may be given up to 36 wks if initial window missed): 1/15  [x] RSV (32-36 wks) (Sept. to end of ): N/A  [x] Flu Vaccine: Given 24    [x] Breastfeeding: Yes, plans to breastfeed, took course  [x] Postpartum Birth control method: Discussed, declines, plans for lactational amenorrhea  [] GBS at 36 - 37 wks:  [] 39 weeks discussion of IOL vs. Expectant management:  [] Mode of delivery ( anticipated ):           Current Assessment & Plan     Up to date               RTC in 2 weeks      Cinthya Rivas MD

## 2025-02-06 ENCOUNTER — APPOINTMENT (OUTPATIENT)
Dept: OBSTETRICS AND GYNECOLOGY | Facility: CLINIC | Age: 30
End: 2025-02-06
Payer: COMMERCIAL

## 2025-02-06 VITALS — DIASTOLIC BLOOD PRESSURE: 60 MMHG | BODY MASS INDEX: 29.1 KG/M2 | WEIGHT: 206 LBS | SYSTOLIC BLOOD PRESSURE: 102 MMHG

## 2025-02-06 DIAGNOSIS — Z3A.31 31 WEEKS GESTATION OF PREGNANCY (HHS-HCC): Primary | ICD-10-CM

## 2025-02-06 PROBLEM — O36.8130: Status: RESOLVED | Noted: 2025-01-15 | Resolved: 2025-02-06

## 2025-02-06 PROCEDURE — 0501F PRENATAL FLOW SHEET: CPT | Performed by: OBSTETRICS & GYNECOLOGY

## 2025-02-06 ASSESSMENT — EDINBURGH POSTNATAL DEPRESSION SCALE (EPDS)
I HAVE BLAMED MYSELF UNNECESSARILY WHEN THINGS WENT WRONG: NO, NEVER
I HAVE FELT SCARED OR PANICKY FOR NO GOOD REASON: NO, NOT AT ALL
THE THOUGHT OF HARMING MYSELF HAS OCCURRED TO ME: NEVER
I HAVE BEEN ABLE TO LAUGH AND SEE THE FUNNY SIDE OF THINGS: AS MUCH AS I ALWAYS COULD
THINGS HAVE BEEN GETTING ON TOP OF ME: NO, I HAVE BEEN COPING AS WELL AS EVER
I HAVE LOOKED FORWARD WITH ENJOYMENT TO THINGS: AS MUCH AS I EVER DID
TOTAL SCORE: 0
I HAVE FELT SAD OR MISERABLE: NO, NOT AT ALL
I HAVE BEEN SO UNHAPPY THAT I HAVE BEEN CRYING: NO, NEVER
I HAVE BEEN SO UNHAPPY THAT I HAVE HAD DIFFICULTY SLEEPING: NOT AT ALL
I HAVE BEEN ANXIOUS OR WORRIED FOR NO GOOD REASON: NO, NOT AT ALL

## 2025-02-06 NOTE — PROGRESS NOTES
Ob Follow-up  25     SUBJECTIVE    HPI: Nirmala Hollins is a 29 y.o.  at 31w3d here for RPNV.  She has no contractions, bleeding, or LOF. Reports normal fetal movement. Patient reports feel very well.     OBJECTIVE  Visit Vitals  /60   Wt 93.4 kg (206 lb)   LMP 2024 (Exact Date)   BMI 29.10 kg/m²   OB Status Pregnant   Smoking Status Never   BSA 2.16 m²        ASSESSMENT & PLAN    Nirmala Hollins is a 29 y.o.  at 31w3d here for the following concerns we addressed today:    Problem List Items Addressed This Visit          Pregnancy    31 weeks gestation of pregnancy ([facility]) - Primary    Overview     Desired provider in labor: [] CNM  [x] Physician  [x] Blood Products: [x] Yes, accepts [] No, needs counseling  [x] Initial BMI: 24  [x] Prenatal Labs: A+, RI, Hgb 12.8, syphilis not done  [x] Cervical Cancer Screening up to date, [facility] 2023  [x] Rh status:  positive  [x] Genetic Screening:  rr cfDNA, neg CF/SMA/fragile X  [x] NT US: (11-13 wks): Normal  [x] Baby ASA (if indicated): Not indicated  [x] Pregnancy dated by: LMP consistent with 11 week ultrasound    [x] Anatomy US: (19-20 wks): Normal  [x] Federal Sterilization consent signed (if indicated): N/A  [x] 1hr GCT at 24-28wks: 83  [x] Rhogam (if indicated): Not indicated  [x] Fetal Surveillance (if indicated): Not indicated at this time  [x] Tdap (27-32 wks, may be given up to 36 wks if initial window missed): 1/15  [x] RSV (32-36 wks) (Sept. to end ): N/A  [x] Flu Vaccine: Given 24    [x] Breastfeeding: Yes, plans to breastfeed, took course  [x] Postpartum Birth control method: Discussed, declines, plans for lactational amenorrhea  [] GBS at 36 - 37 wks:  [] 39 weeks discussion of IOL vs. Expectant management:  [x] Mode of delivery ( anticipated ): , [facility] for [facility]eds, had tour of [facility]                No orders of the defined types were placed in this encounter.     RTC in 3 weeks    Cinthya Rivas MD

## 2025-02-20 ENCOUNTER — APPOINTMENT (OUTPATIENT)
Dept: OBSTETRICS AND GYNECOLOGY | Facility: CLINIC | Age: 30
End: 2025-02-20
Payer: COMMERCIAL

## 2025-02-20 VITALS — SYSTOLIC BLOOD PRESSURE: 106 MMHG | WEIGHT: 211 LBS | BODY MASS INDEX: 29.8 KG/M2 | DIASTOLIC BLOOD PRESSURE: 60 MMHG

## 2025-02-20 DIAGNOSIS — O26.03 MATERNAL EXCESSIVE WEIGHT GAIN, THIRD TRIMESTER (HHS-HCC): Primary | ICD-10-CM

## 2025-02-20 DIAGNOSIS — Z3A.33 33 WEEKS GESTATION OF PREGNANCY (HHS-HCC): ICD-10-CM

## 2025-02-20 PROCEDURE — 0501F PRENATAL FLOW SHEET: CPT | Performed by: OBSTETRICS & GYNECOLOGY

## 2025-02-20 NOTE — PROGRESS NOTES
Ob Follow-up  25     SUBJECTIVE      HPI: Nirmala Hollins is a 29 y.o.  at 33w3d here for RPNV.  She has no contractions, bleeding, or LOF. Reports normal fetal movement.     OBJECTIVE  Visit Vitals  /60   Wt 95.7 kg (211 lb)   LMP 2024 (Exact Date)   BMI 29.80 kg/m²   OB Status Pregnant   Smoking Status Never   BSA 2.18 m²            ASSESSMENT & PLAN    Nirmala Hollins is a 29 y.o.  at 33w3d here for the following concerns we addressed today:    Problem List Items Addressed This Visit          Pregnancy    Maternal excessive weight gain, third trimester ([facility]) - Primary    Overview     Growth ultrasound scheduled at 34 weeks  Scheduled for ultrasound on          33 weeks gestation of pregnancy ([facility])    Overview     Desired provider in labor: [] CNM  [x] Physician  [x] Blood Products: [x] Yes, accepts [] No, needs counseling  [x] Initial BMI: 24  [x] Prenatal Labs: A+, RI, Hgb 12.8, syphilis not done  [x] Cervical Cancer Screening up to date, [facility] 2023  [x] Rh status:  positive  [x] Genetic Screening:  rr cfDNA, neg CF/SMA/fragile X  [x] NT US: (11-13 wks): Normal  [x] Baby ASA (if indicated): Not indicated  [x] Pregnancy dated by: LMP consistent with 11 week ultrasound    [x] Anatomy US: (19-20 wks): Normal  [x] Federal Sterilization consent signed (if indicated): N/A  [x] 1hr GCT at 24-28wks: 83  [x] Rhogam (if indicated): Not indicated  [x] Fetal Surveillance (if indicated): Not indicated at this time  [x] Tdap (27-32 wks, may be given up to 36 wks if initial window missed): 1/15  [x] RSV (32-36 wks) (Sept. to end of ): N/A  [x] Flu Vaccine: Given 24    [x] Breastfeeding: Yes, plans to breastfeed, took course  [x] Postpartum Birth control method: Discussed, declines, plans for lactational amenorrhea  [] GBS at 36 - 37 wks:  [] 39 weeks discussion of IOL vs. Expectant management: discussed  and will consider  [x] Mode of delivery ( anticipated ): ,  [facility] for peds, [place] [facility], had tour              RTC in 2 weeks      Cinthya Rivas MD

## 2025-03-06 ENCOUNTER — APPOINTMENT (OUTPATIENT)
Dept: OBSTETRICS AND GYNECOLOGY | Facility: CLINIC | Age: 30
End: 2025-03-06
Payer: COMMERCIAL

## 2025-03-06 VITALS — DIASTOLIC BLOOD PRESSURE: 66 MMHG | WEIGHT: 212 LBS | SYSTOLIC BLOOD PRESSURE: 102 MMHG | BODY MASS INDEX: 29.95 KG/M2

## 2025-03-06 DIAGNOSIS — Z3A.35 35 WEEKS GESTATION OF PREGNANCY (HHS-HCC): ICD-10-CM

## 2025-03-06 DIAGNOSIS — O26.03 MATERNAL EXCESSIVE WEIGHT GAIN, THIRD TRIMESTER (HHS-HCC): Primary | ICD-10-CM

## 2025-03-06 PROCEDURE — 0501F PRENATAL FLOW SHEET: CPT | Performed by: OBSTETRICS & GYNECOLOGY

## 2025-03-06 NOTE — PROGRESS NOTES
Ob Follow-up  25     SUBJECTIVE      HPI: Nirmala Bhatti is a 29 y.o.  at 35w3d here for RPNV.  She has no contractions, bleeding, or LOF. Reports normal fetal movement. Patient reports feeling well.        OBJECTIVE  Visit Vitals  /66   Wt 96.2 kg (212 lb)   LMP 2024 (Exact Date)   BMI 29.95 kg/m²   OB Status Pregnant   Smoking Status Never   BSA 2.19 m²            ASSESSMENT & PLAN    Nirmala Bhatti is a 29 y.o.  at 35w3d here for the following concerns we addressed today:    Problem List Items Addressed This Visit          Pregnancy    Maternal excessive weight gain, third trimester ([facility]) - Primary    Overview     Growth ultrasound scheduled at 34 week showed EFW 2866g (91%tile, AC 97%tile)  Repeat in 3-4 weeks was recommended, patient may decline  Risks of shoulder dystocia reviewed on 3/6         35 weeks gestation of pregnancy ([facility])    Overview     Desired provider in labor: [] CNM  [x] Physician  [x] Blood Products: [x] Yes, accepts [] No, needs counseling  [x] Initial BMI: 24  [x] Prenatal Labs: A+, RI, Hgb 12.8, syphilis not done  [x] Cervical Cancer Screening up to date, [facility] 2023  [x] Rh status:  positive  [x] Genetic Screening:  rr cfDNA, neg CF/SMA/fragile X  [x] NT US: (11-13 wks): Normal  [x] Baby ASA (if indicated): Not indicated  [x] Pregnancy dated by: LMP consistent with 11 week ultrasound    [x] Anatomy US: (19-20 wks): Normal  [x] Federal Sterilization consent signed (if indicated): N/A  [x] 1hr GCT at 24-28wks: 83  [x] Rhogam (if indicated): Not indicated  [x] Fetal Surveillance (if indicated): Not indicated at this time  [x] Tdap (27-32 wks, may be given up to 36 wks if initial window missed): 1/15  [x] RSV (32-36 wks) (Sept. to end of ): N/A  [x] Flu Vaccine: Given 24    [x] Breastfeeding: Yes, plans to breastfeed, took course  [x] Postpartum Birth control method: Discussed, declines, plans for lactational amenorrhea  [] GBS at 36 - 37  wks:  [] 39 weeks discussion of IOL vs. Expectant management: discussed  and will consider  [x] Mode of delivery ( anticipated ): , [facility] for peds, plans for delivery at [facility] [place], had tour           Current Assessment & Plan     Discussed GBS for next visit                No orders of the defined types were placed in this encounter.       RTC in 1 week      Cinthya Rivas MD

## 2025-03-13 ENCOUNTER — APPOINTMENT (OUTPATIENT)
Dept: OBSTETRICS AND GYNECOLOGY | Facility: CLINIC | Age: 30
End: 2025-03-13
Payer: COMMERCIAL

## 2025-03-13 VITALS — SYSTOLIC BLOOD PRESSURE: 112 MMHG | DIASTOLIC BLOOD PRESSURE: 60 MMHG | BODY MASS INDEX: 30.17 KG/M2 | WEIGHT: 213.6 LBS

## 2025-03-13 DIAGNOSIS — O26.03 MATERNAL EXCESSIVE WEIGHT GAIN, THIRD TRIMESTER (HHS-HCC): Primary | ICD-10-CM

## 2025-03-13 DIAGNOSIS — Z3A.36 36 WEEKS GESTATION OF PREGNANCY (HHS-HCC): ICD-10-CM

## 2025-03-13 PROCEDURE — 0501F PRENATAL FLOW SHEET: CPT | Performed by: OBSTETRICS & GYNECOLOGY

## 2025-03-13 NOTE — PROGRESS NOTES
Ob Follow-up  25     SUBJECTIVE      HPI: Nirmala Bhatti is a 29 y.o.  at 36w3d here for RPNV.  She has no contractions, bleeding, or LOF. Reports normal fetal movement. Patient reports no concerns, feels well       OBJECTIVE  Visit Vitals  /60   Wt 96.9 kg (213 lb 9.6 oz)   LMP 2024 (Exact Date)   BMI 30.17 kg/m²   OB Status Pregnant   Smoking Status Never   BSA 2.2 m²      SVE 1/30/-3    ASSESSMENT & PLAN    Nirmala Bhatti is a 29 y.o.  at 36w3d here for the following concerns we addressed today:    Problem List Items Addressed This Visit          Pregnancy    Maternal excessive weight gain, third trimester ([facility]) - Primary    Overview     Growth ultrasound scheduled at 34 week showed EFW 2866g (91%tile, AC 97%tile)  Repeat in 3-4 weeks was recommended, patient may decline  Risks of shoulder dystocia reviewed on 3/6         Relevant Orders    Group B Streptococcus (GBS) Prenatal Screen, Culture    36 weeks gestation of pregnancy ([facility])    Overview     Desired provider in labor: [] CNM  [x] Physician  [x] Blood Products: [x] Yes, accepts [] No, needs counseling  [x] Initial BMI: 24  [x] Prenatal Labs: A+, RI, Hgb 12.8, syphilis not done  [x] Cervical Cancer Screening up to date, [facility] 2023  [x] Rh status:  positive  [x] Genetic Screening:  rr cfDNA, neg CF/SMA/fragile X  [x] NT US: (11-13 wks): Normal  [x] Baby ASA (if indicated): Not indicated  [x] Pregnancy dated by: LMP consistent with 11 week ultrasound    [x] Anatomy US: (19-20 wks): Normal  [x] Federal Sterilization consent signed (if indicated): N/A  [x] 1hr GCT at 24-28wks: 83  [x] Rhogam (if indicated): Not indicated  [x] Fetal Surveillance (if indicated): Not indicated at this time  [x] Tdap (27-32 wks, may be given up to 36 wks if initial window missed): 1/15  [x] RSV (32-36 wks) (Sept. to end of ): N/A  [x] Flu Vaccine: Given 9/30/24    [x] Breastfeeding: Yes, plans to breastfeed, took course  [x] Postpartum  Birth control method: Discussed, declines, plans for lactational amenorrhea  [] GBS at 36 - 37 wks:  [] 39 weeks discussion of IOL vs. Expectant management: discussed  and will consider  [x] Mode of delivery ( anticipated ): , [facility] for [facility], plans for delivery at [facility], had tour           Current Assessment & Plan     GBS collected today          Relevant Orders    Group B Streptococcus (GBS) Prenatal Screen, Culture         Orders Placed This Encounter   Procedures    Group B Streptococcus (GBS) Prenatal Screen, Culture     Order Specific Question:   Release result to [employer]     Answer:   Immediate [1]      RTC in 1 week    Cinthya Rivas MD

## 2025-03-16 LAB — GP B STREP SPEC QL CULT: ABNORMAL

## 2025-03-17 ENCOUNTER — APPOINTMENT (OUTPATIENT)
Dept: OBSTETRICS AND GYNECOLOGY | Facility: CLINIC | Age: 30
End: 2025-03-17
Payer: COMMERCIAL

## 2025-03-28 ENCOUNTER — APPOINTMENT (OUTPATIENT)
Dept: OBSTETRICS AND GYNECOLOGY | Facility: CLINIC | Age: 30
End: 2025-03-28
Payer: COMMERCIAL

## 2025-03-28 VITALS — BODY MASS INDEX: 30.51 KG/M2 | WEIGHT: 216 LBS | SYSTOLIC BLOOD PRESSURE: 120 MMHG | DIASTOLIC BLOOD PRESSURE: 60 MMHG

## 2025-03-28 DIAGNOSIS — B95.1 GROUP BETA STREP POSITIVE: ICD-10-CM

## 2025-03-28 DIAGNOSIS — Z3A.38 38 WEEKS GESTATION OF PREGNANCY (HHS-HCC): ICD-10-CM

## 2025-03-28 DIAGNOSIS — O26.03 MATERNAL EXCESSIVE WEIGHT GAIN, THIRD TRIMESTER (HHS-HCC): Primary | ICD-10-CM

## 2025-03-28 PROCEDURE — 0501F PRENATAL FLOW SHEET: CPT | Performed by: OBSTETRICS & GYNECOLOGY

## 2025-04-04 ENCOUNTER — APPOINTMENT (OUTPATIENT)
Dept: OBSTETRICS AND GYNECOLOGY | Facility: CLINIC | Age: 30
End: 2025-04-04
Payer: COMMERCIAL

## 2025-04-04 VITALS — SYSTOLIC BLOOD PRESSURE: 110 MMHG | WEIGHT: 217 LBS | BODY MASS INDEX: 30.65 KG/M2 | DIASTOLIC BLOOD PRESSURE: 60 MMHG

## 2025-04-04 DIAGNOSIS — Z3A.39 39 WEEKS GESTATION OF PREGNANCY (HHS-HCC): ICD-10-CM

## 2025-04-04 DIAGNOSIS — B95.1 GROUP BETA STREP POSITIVE: Primary | ICD-10-CM

## 2025-04-04 PROCEDURE — 0501F PRENATAL FLOW SHEET: CPT | Performed by: OBSTETRICS & GYNECOLOGY

## 2025-04-04 NOTE — PROGRESS NOTES
Ob Follow-up  25     SUBJECTIVE      HPI: Nirmala Bhatti is a 30 y.o.  at 39w4d here for RPNV.  She has no contractions, bleeding, or LOF. Reports normal fetal movement. Patient reports no concerns       OBJECTIVE  Visit Vitals  /60   Wt 98.4 kg (217 lb)   LMP 2024 (Exact Date)   BMI 30.65 kg/m²   OB Status Pregnant   Smoking Status Never   BSA 2.21 m²      SVE 3/50/-2, membranes stripped      ASSESSMENT & PLAN    Nirmala Bhatti is a 30 y.o.  at 39w4d here for the following concerns we addressed today:    Problem List Items Addressed This Visit          Pregnancy    Group beta Strep positive - Primary    Overview     - Plan PCN in labor         39 weeks gestation of pregnancy ([facility])    Overview     Desired provider in labor: [] CNM  [x] Physician  [x] Blood Products: [x] Yes, accepts [] No, needs counseling  [x] Initial BMI: 24  [x] Prenatal Labs: A+, RI, Hgb 12.8, syphilis not done  [x] Cervical Cancer Screening up to date, [facility] 2023  [x] Rh status:  positive  [x] Genetic Screening:  rr cfDNA, neg CF/SMA/fragile X  [x] NT US: (11-13 wks): Normal  [x] Baby ASA (if indicated): Not indicated  [x] Pregnancy dated by: LMP consistent with 11 week ultrasound    [x] Anatomy US: (19-20 wks): Normal  [x] Federal Sterilization consent signed (if indicated): N/A  [x] 1hr GCT at 24-28wks: 83  [x] Rhogam (if indicated): Not indicated  [x] Fetal Surveillance (if indicated): Not indicated at this time  [x] Tdap (27-32 wks, may be given up to 36 wks if initial window missed): 1/15  [x] RSV (32-36 wks) (Sept. to end of ): N/A  [x] Flu Vaccine: Given 24    [x] Breastfeeding: Yes, plans to breastfeed, took course  [x] Postpartum Birth control method: Discussed, declines, plans for lactational amenorrhea  [x] GBS at 36 - 37 wks: pos  [x] 39 weeks discussion of IOL vs. Expectant management: Desires spontaneous labor, agrees to IOL close to 41wks, requested   [x] Mode of delivery (  anticipated ): , [facility] for [facility], [place] [facility] tour                No orders of the defined types were placed in this encounter.       RTC in 1 week      Cinthya Rivas MD

## 2025-08-08 ENCOUNTER — APPOINTMENT (OUTPATIENT)
Dept: OBSTETRICS AND GYNECOLOGY | Facility: CLINIC | Age: 30
End: 2025-08-08
Payer: COMMERCIAL